# Patient Record
Sex: MALE | Race: BLACK OR AFRICAN AMERICAN
[De-identification: names, ages, dates, MRNs, and addresses within clinical notes are randomized per-mention and may not be internally consistent; named-entity substitution may affect disease eponyms.]

---

## 2018-10-01 ENCOUNTER — HOSPITAL ENCOUNTER (EMERGENCY)
Dept: HOSPITAL 25 - UCEAST | Age: 28
Discharge: HOME | End: 2018-10-01
Payer: SELF-PAY

## 2018-10-01 VITALS — SYSTOLIC BLOOD PRESSURE: 125 MMHG | DIASTOLIC BLOOD PRESSURE: 79 MMHG

## 2018-10-01 DIAGNOSIS — J02.8: Primary | ICD-10-CM

## 2018-10-01 DIAGNOSIS — H61.22: ICD-10-CM

## 2018-10-01 DIAGNOSIS — F17.200: ICD-10-CM

## 2018-10-01 PROCEDURE — 86665 EPSTEIN-BARR CAPSID VCA: CPT

## 2018-10-01 PROCEDURE — 36415 COLL VENOUS BLD VENIPUNCTURE: CPT

## 2018-10-01 PROCEDURE — 87651 STREP A DNA AMP PROBE: CPT

## 2018-10-01 PROCEDURE — 99203 OFFICE O/P NEW LOW 30 MIN: CPT

## 2018-10-01 PROCEDURE — G0463 HOSPITAL OUTPT CLINIC VISIT: HCPCS

## 2018-10-01 PROCEDURE — 86664 EPSTEIN-BARR NUCLEAR ANTIGEN: CPT

## 2018-10-01 PROCEDURE — 86308 HETEROPHILE ANTIBODY SCREEN: CPT

## 2018-10-01 NOTE — UC
Throat Pain/Nasal Vadim HPI





- HPI Summary


HPI Summary: 


 27 y/o male with nbo prior PMH, no recent ABX use with complaints of sore 

throat x 3 days, left ear pain x 24 hours.  + tob use, no ill contact.  + GI 

symptoms- nausea, no vomiting.  no fever, + chills.  no neck pain, SOB.   tried 

tylenol, no relief.  denies nasal drainage, soreness.  no abdominal pains.  + 

pain with swallowing, however able to do so.   








- History of Current Complaint


Chief Complaint: UCGeneralIllness


Stated Complaint: SORE THROAT, AND EAR ACHE


Time Seen by Provider: 10/01/18 08:12


Hx Obtained From: Patient


Onset/Duration: Gradual Onset, Lasting Days


Severity: Moderate


Pain Intensity: 7


Pain Scale Used: 0-10 Numeric


Cough: None


Associated Signs & Symptoms: Positive: Hoarseness


Related History: Smoking





- Allergies/Home Medications


Allergies/Adverse Reactions: 


 Allergies











Allergy/AdvReac Type Severity Reaction Status Date / Time


 


No Known Allergies Allergy   Verified 10/01/18 07:48











Home Medications: 


 Home Medications





Acetaminophen [Tylenol Extra Strength] 2 tab PO BID PRN 10/01/18 [History 

Confirmed 10/01/18]











PMH/Surg Hx/FS Hx/Imm Hx


Previously Healthy: Yes





- Surgical History


Surgical History: None





- Social History


Alcohol Use: Weekly


Substance Use Type: None


Smoking Status (MU): Heavy Every Day Tobacco Smoker





- Immunization History


Most Recent Tetanus Shot: Unk





Review of Systems


Constitutional: Chills, Fatigue


ENT: Sore Throat, Ear Ache


Cardiovascular: Negative


Gastrointestinal: Nausea


Genitourinary: Negative


Is Patient Immunocompromised?: No


All Other Systems Reviewed And Are Negative: Yes





Physical Exam


Triage Information Reviewed: Yes


Appearance: No Pain Distress, Well-Nourished, Ill-Appearing - minimally


Vital Signs: 


 Initial Vital Signs











Temp  97.4 F   10/01/18 07:45


 


Pulse  87   10/01/18 07:45


 


Resp  20   10/01/18 07:45


 


BP  125/79   10/01/18 07:45


 


Pulse Ox  98   10/01/18 07:45











Eyes: Positive: Conjunctiva Clear


ENT: Positive: Pharyngeal erythema - mild erythema with mild tonsillar 

enlargement, difficult to examine well due to patients inability to relax 

tongue.   strep Neg.  + submand LAD, tender to palpation, no neck stiffness/ 

pain.   no posterior LAD.   L ear with cerumen impaction, R TM normal., TMs 

normal - right, Uvula midline.  Negative: Sinus tenderness


Dental Exam: Normal


Neck: Positive: Enlarged Nodes @ - submand b/l  no posterior LAD.  Negative: 

Nuchal Rigidity


Respiratory Exam: Normal


Abdomen Description: Positive: Nontender, No Organomegaly, Soft.  Negative: 

Splenomegaly


Neurological: Positive: Alert


Psychological: Positive: Age Appropriate Behavior


Skin: Positive: Other - vitiligo





Throat Pain/Nasal Course/Dx





- Course


Course Of Treatment: strep negative, monospot obtained.   Conservative treatment

, OTCs, motrin, throat lozenges, work note.





- Differential Dx/Diagnosis


Differential Diagnosis/HQI/PQRI: Pharyngitis


Provider Diagnoses: viral pharyngitis, left cerumen impaction





Discharge





- Sign-Out/Discharge


Documenting (check all that apply): Patient Departure


All imaging exams completed and their final reports reviewed: Yes





- Discharge Plan


Condition: Good


Disposition: HOME


Prescriptions: 


Dextromethorphan/Benzocaine [Cepacol Sorethroat-Cough Arian] 1 each PO Q4HR #30 

lozenge


Patient Education Materials:  Pharyngitis (ED)


Forms:  *Work Release


Referrals: 


No Primary Care Phys,NOPCP [Primary Care Provider] - 


Additional Instructions: 


- Increase fluid intake 


- Motrin/ iburpfen as needed for throat pain 


- humidifier at night to help with symptoms 


- Go to ER with decreased swallowing, fever > 102, abdominal pain 


- Avoid contact sports until results of monospot return in 24 hours 


  





- Billing Disposition and Condition


Condition: GOOD


Disposition: Home

## 2018-11-26 ENCOUNTER — HOSPITAL ENCOUNTER (EMERGENCY)
Dept: HOSPITAL 25 - ED | Age: 28
Discharge: HOME | End: 2018-11-26
Payer: SELF-PAY

## 2018-11-26 VITALS — SYSTOLIC BLOOD PRESSURE: 113 MMHG | DIASTOLIC BLOOD PRESSURE: 71 MMHG

## 2018-11-26 DIAGNOSIS — R42: ICD-10-CM

## 2018-11-26 DIAGNOSIS — R07.89: Primary | ICD-10-CM

## 2018-11-26 DIAGNOSIS — F17.200: ICD-10-CM

## 2018-11-26 LAB
BASOPHILS # BLD AUTO: 0 10^3/UL (ref 0–0.2)
EOSINOPHIL # BLD AUTO: 0 10^3/UL (ref 0–0.6)
HCT VFR BLD AUTO: 41 % (ref 42–52)
HGB BLD-MCNC: 14 G/DL (ref 14–18)
LYMPHOCYTES # BLD AUTO: 2.2 10^3/UL (ref 1–4.8)
MCH RBC QN AUTO: 31 PG (ref 27–31)
MCHC RBC AUTO-ENTMCNC: 34 G/DL (ref 31–36)
MCV RBC AUTO: 90 FL (ref 80–94)
MONOCYTES # BLD AUTO: 0.4 10^3/UL (ref 0–0.8)
NEUTROPHILS # BLD AUTO: 1.8 10^3/UL (ref 1.5–7.7)
NRBC # BLD AUTO: 0 10^3/UL
NRBC BLD QL AUTO: 0.2
PLATELET # BLD AUTO: 255 10^3/UL (ref 150–450)
RBC # BLD AUTO: 4.59 10^6/UL (ref 4–5.4)
WBC # BLD AUTO: 4.5 10^3/UL (ref 3.5–10.8)

## 2018-11-26 PROCEDURE — 99284 EMERGENCY DEPT VISIT MOD MDM: CPT

## 2018-11-26 PROCEDURE — 84484 ASSAY OF TROPONIN QUANT: CPT

## 2018-11-26 PROCEDURE — 93005 ELECTROCARDIOGRAM TRACING: CPT

## 2018-11-26 PROCEDURE — 80053 COMPREHEN METABOLIC PANEL: CPT

## 2018-11-26 PROCEDURE — 83605 ASSAY OF LACTIC ACID: CPT

## 2018-11-26 PROCEDURE — 85025 COMPLETE CBC W/AUTO DIFF WBC: CPT

## 2018-11-26 PROCEDURE — 71046 X-RAY EXAM CHEST 2 VIEWS: CPT

## 2018-11-26 PROCEDURE — 36415 COLL VENOUS BLD VENIPUNCTURE: CPT

## 2018-11-26 PROCEDURE — 85379 FIBRIN DEGRADATION QUANT: CPT

## 2018-11-26 NOTE — ED
HPI Chest Pain





- HPI Summary


HPI Summary: 


The pt is a 29 y/o male brought in by ambulance to [facility] c/o  since 10:50 hrs 

today. It started on the R side and moved gradually to the L side. The sharp 

pain rated 4/10 in severity is aggravated by raising his L arm, car motion, 

laughing and palpation. He notes lightheadedness but denies nausea, SOB, cold 

sweats and injury. He took Aleve to no relief. The pt  reports no pertinent 

PMHx. His mother aged 63 is still alive. 





 Home Medications











 Medication  Instructions  Recorded  Confirmed  Type


 


Acetaminophen [Acetaminophen Extra 1,000 mg PO BID PRN 11/26/18 11/26/18 History





Strength]    


 


Dextromethorphan/Benzocaine 1 each PO Q4HR PRN 11/26/18 11/26/18 History





[Cepacol Sorethroat-Cough Arian]    














- History of Current Complaint


Chief Complaint: EDChestWallPain


Time Seen by Provider: 11/26/18 17:18


Hx Obtained From: Patient


Onset/Duration: Started Hours Ago - 10:50 hrs today, Still Present


Timing: Intermittent


Initial Severity: Mild


Current Severity: Mild


Pain Intensity: 4


Pain Scale Used: 0-10 Numeric


Chest Pain Location: Diffuse - R side


Chest Pain Radiates: Yes


Chest Pain Radiates To:: Other - To the L side


Character: Sharp/Stabbing


Aggravating Factor(s): Other: - raising his L arm, bumpy car rides,  laughing 

and palpation.


Alleviating Factor(s): Nothing


Associated Signs and Symptoms: Positive: Chest Pain, Lightheadedness.  Negative

: Shortness of Breath, Nausea





- Allergy/Home Medications


Allergies/Adverse Reactions: 


 Allergies











Allergy/AdvReac Type Severity Reaction Status Date / Time


 


No Known Allergies Allergy   Verified 10/01/18 07:48











Home Medications: 


 Home Medications





Acetaminophen [Acetaminophen Extra Strength] 1,000 mg PO BID PRN 11/26/18 [

History Confirmed 11/26/18]


Dextromethorphan/Benzocaine [Cepacol Sorethroat-Cough Arian] 1 each PO Q4HR PRN 11 /26/18 [History Confirmed 11/26/18]











PMH/Surg Hx/FS Hx/Imm Hx


Previously Healthy: Yes


Endocrine/Hematology History: 


   Denies: Hx Diabetes


Cardiovascular History: 


   Denies: Hx Hypercholesterolemia, Hx Hypertension


Respiratory History: Reports: Other Respiratory Problems/Disorders - Pharyngitis


Sensory History: 


   Denies: Hx Deafness





- Cancer History


Cancer Type, Location and Year: None reported





- Surgical History


Surgery Procedure, Year, and Place: None reported


Infectious Disease History: No


Infectious Disease History: 


   Denies: Traveled Outside the [place] in Last 30 Days





- Family History


Known Family History: 


   Negative: Cardiac Disease, Hypertension, Diabetes





- Social History


Occupation: Employed Full-time


Lives: Dormitory/Roommates - Significant other


Alcohol Use: Weekly


Substance Use Type: Reports: None


Hx Tobacco Use: Yes


Smoking Status (MU): Heavy Every Day Tobacco Smoker





Review of Systems


Constitutional: Other - Positive: Lightheadedness


Negative: Skin Diaphoresis


Positive: Chest Pain


Negative: Shortness Of Breath


Negative: Nausea


Musculoskeletal: Negative - Injury 


All Other Systems Reviewed And Are Negative: Yes





Physical Exam





- Summary


Physical Exam Summary: 


Appearance: The patient is well-nourished in no acute distress and in no acute 

pain.


 


Skin: The skin is warm and dry and skin color reflects adequate perfusion.


 


HEENT: The head is normocephalic and atraumatic. The pupils are equal and 

reactive. The conjunctivae are clear and without drainage. Nares are patent and 

without drainage. Mouth reveals moist mucous membranes and the throat is 

without erythema and exudate. The external ears are intact. The ear canals are 

patent and without drainage. The tympanic membranes are intact.


 


Neck: The neck is supple with full range of motion and non-tender. There are no 

carotid bruits. There is no neck vein distension.


 


Respiratory: Chest is tender to palpation in the sternum. Lungs are clear to 

auscultation and breath sounds are symmetrical and equal.


 


Cardiovascular: Heart is regular rate and rhythm. There is no murmur or rub 

auscultated. There is no peripheral edema and pulses are symmetrical and equal.


 


Abdomen: The abdomen is soft and non-tender. There are normal bowel sounds 

heard in all four quadrants and there is no organomegaly palpated.


 


Musculoskeletal: There is no back tenderness noted. Extremities are non-tender 

with full range of motion. There is good capillary refill. There is no 

peripheral edema or calf tenderness elicited.


 


Neurological: Patient is alert and oriented to person, place and time. The 

patient has symmetrical motor strength in all four extremities. Cranial nerves 

are grossly intact. Deep tendon reflexes are symmetrical and equal in all four 

extremities.


 


Psychiatric: The patient has an appropriate affect and does not exhibit any 

anxiety or depression.


Triage Information Reviewed: Yes


Vital Signs On Initial Exam: 


 Initial Vitals











Temp Pulse Resp BP Pulse Ox


 


 98.2 F   90   16   123/85   98 


 


 11/26/18 17:09  11/26/18 17:09  11/26/18 17:09  11/26/18 17:09  11/26/18 17:09











Vital Signs Reviewed: Yes





Diagnostics





- Vital Signs


 Vital Signs











  Temp Pulse Resp BP Pulse Ox


 


 11/26/18 17:19   86   


 


 11/26/18 17:10   81    98


 


 11/26/18 17:09  98.2 F  86  16  123/85  98














- Laboratory


Result Diagrams: 


 11/26/18 17:59





 11/26/18 17:59


Lab Statement: Any lab studies that have been ordered have been reviewed, and 

results considered in the medical decision making process.





- Radiology


  ** CXR


Radiology Interpretation Completed By: Radiologist - IMPRESSION:  #. No 

evidence for acute intrathoracic disease. The ED physician reviewed this 

radiology report.





- EKG


  ** 17:08


Cardiac Rate: NL - 80 bpm


EKG Rhythm: Sinus Rhythm


ST Segment: Normal


Ectopy: None


EKG Comparison: No Significant Change





Chest Pain Course/Dx





- Course


Course Of Treatment: Mr. Rowland presented with an atypical chest pain that 

originated in the back.  He was nontoxic in appearance with stable vital signs 

on presentation.  He was kept on the monitor while EKG, chest x-ray and labs 

including d-dimer and delayed troponin were obtained.  These were all negative 

and I reassured him that nothing dangerous was happening and recommended 

nonsteroidal anti-inflammatories.





- Diagnoses


Provider Diagnoses: 


 Chest pain








Discharge





- Sign-Out/Discharge


Documenting (check all that apply): Patient Departure - DC





- Discharge Plan


Condition: Stable


Disposition: HOME


Patient Education Materials:  Chest Pain (ED)


Referrals: 


[facility] [Outside]


Additional Instructions: 


Take Naproxen(Aleve) as needed. 


Follow up with your PCP in 2 days


Return to ED for any new or worsening symptoms








- Billing Disposition and Condition


Condition: STABLE


Disposition: Home





- Attestation Statements


Document Initiated by Scribe: Yes


Documenting Scribe: Natalie Saini 


Provider For Whom Scribe is Documenting (Include Credential): Dr. Anoop Licea MD 


Scribe Attestation: 


Natalie MACKEY , scribed for Dr. Anoop Licea MD  on 11/26/18 at 2132. 


Scribe Documentation Reviewed: Yes


Provider Attestation: 


The documentation as recorded by the scribe, Natalie Saini  accurately 

reflects the service I personally performed and the decisions made by me, Dr. Anoop Licea MD

## 2019-10-16 ENCOUNTER — HOSPITAL ENCOUNTER (EMERGENCY)
Dept: HOSPITAL 25 - UCEAST | Age: 29
Discharge: HOME | End: 2019-10-16
Payer: SELF-PAY

## 2019-10-16 VITALS — SYSTOLIC BLOOD PRESSURE: 118 MMHG | DIASTOLIC BLOOD PRESSURE: 77 MMHG

## 2019-10-16 DIAGNOSIS — J02.9: Primary | ICD-10-CM

## 2019-10-16 DIAGNOSIS — F17.210: ICD-10-CM

## 2019-10-16 DIAGNOSIS — H66.92: ICD-10-CM

## 2019-10-16 PROCEDURE — G0463 HOSPITAL OUTPT CLINIC VISIT: HCPCS

## 2019-10-16 PROCEDURE — 99212 OFFICE O/P EST SF 10 MIN: CPT

## 2019-10-16 PROCEDURE — 87651 STREP A DNA AMP PROBE: CPT

## 2019-10-16 NOTE — UC
Ear Complaint HPI





- HPI Summary


HPI Summary: 





Patient has had cold symptoms over the past week with sore throat over the past 

couple days and left earache.  He denies any fever or chills.





- History of Current Complaint


Chief Complaint: UCGeneralIllness


Stated Complaint: SORE THROAT


Time Seen by Provider: 10/16/19 13:32


Onset/Duration: Gradual Onset


Severity Initially: Mild


Severity Currently: Mild


Pain Intensity: 6


Aggravating Factors: Nothing


Associated Signs/Symptoms: Positive: URI Symptoms





- Allergies/Home Medications


Allergies/Adverse Reactions: 


 Allergies











Allergy/AdvReac Type Severity Reaction Status Date / Time


 


No Known Allergies Allergy   Verified 10/16/19 12:10














PMH/Surg Hx/FS Hx/Imm Hx


Previously Healthy: Yes





- Surgical History


Surgical History: None


Surgery Procedure, Year, and Place: None reported





- Family History


Known Family History: 


   Negative: Cardiac Disease, Hypertension, Diabetes





- Social History


Alcohol Use: Occasionally


Substance Use Type: None


Smoking Status (MU): Heavy Every Day Tobacco Smoker


Amount Used/How Often: 1/2 PPD


Household Exposure Type: Cigarettes





- Immunization History


Most Recent Tetanus Shot: Unk





Review of Systems


All Other Systems Reviewed And Are Negative: Yes


ENT: Positive: Sore Throat, Ear Ache, Nasal Discharge


Is Patient Immunocompromised?: No





Physical Exam


Triage Information Reviewed: Yes


Appearance: Well-Appearing, No Pain Distress, Well-Nourished


Vital Signs: 


 Initial Vital Signs











Temp  98.8 F   10/16/19 12:10


 


Pulse  92   10/16/19 12:10


 


Resp  18   10/16/19 12:10


 


BP  118/77   10/16/19 12:10


 


Pulse Ox  100   10/16/19 12:10











Vital Signs Reviewed: Yes


Eyes: Positive: Conjunctiva Clear


ENT: Positive: Pharynx normal, Nasal congestion, Nasal drainage - Clear nasal 

coryza., TM red - Left tympanic membrane is injected and mildly bulging with 

yellow purulence behind the tympanic membrane., Uvula midline.  Negative: 

Tonsillar swelling, Tonsillar exudate, Trismus, Muffled voice, Hoarse voice


Neck: Positive: Supple, Nontender, Enlarged Nodes @ - Bilateral tonsillar lymph 

node enlargement.


Respiratory: Positive: Lungs clear, Normal breath sounds, No respiratory 

distress, No accessory muscle use


Abdomen Description: Positive: Nontender, No Organomegaly, Soft.  Negative: CVA 

Tenderness (R), CVA Tenderness (L), Hepatomegaly, Splenomegaly


Bowel Sounds: Positive: Present


Musculoskeletal Exam: Normal


Neurological Exam: Normal


Psychological Exam: Normal


Skin Exam: Normal





Ear Complaint Course/Dx





- Course


Course Of Treatment: 





Patient is comfortable here.  Rapid strep test was negative.  I am going to 

treated for a left otitis media with amoxicillin 875 mg by mouth twice a day 

10 days.  He is to follow-up with [facility] if any concerns or if 

no improvement in 3 or 4 days.





- Differential Dx/Diagnosis


Provider Diagnosis: 


 Pharyngitis, Left otitis media








Discharge ED





- Sign-Out/Discharge


Documenting (check all that apply): Patient Departure


All imaging exams completed and their final reports reviewed: No Studies





- Discharge Plan


Condition: Good


Disposition: HOME


Prescriptions: 


Amoxicillin PO (*) [Amoxicillin 875 MG (*)] 875 mg PO BID 10 Days #20 tab


Patient Education Materials:  Ear Infection (ED)


Referrals: 


No Primary Care Phys,NOPCP [Primary Care Provider] - 


[facility] [Outside]


Additional Instructions: 


Increase fluids, may take Tylenol every 4 hours and ibuprofen every 8 hours for 

pain or fever.  Follow-up at [facility] if no improvement in 3 or 

4 days.





- Billing Disposition and Condition


Condition: GOOD


Disposition: Home

## 2020-03-14 ENCOUNTER — HOSPITAL ENCOUNTER (EMERGENCY)
Dept: HOSPITAL 25 - UCEAST | Age: 30
Discharge: HOME | End: 2020-03-14
Payer: COMMERCIAL

## 2020-03-14 VITALS — DIASTOLIC BLOOD PRESSURE: 81 MMHG | SYSTOLIC BLOOD PRESSURE: 118 MMHG

## 2020-03-14 DIAGNOSIS — N34.2: Primary | ICD-10-CM

## 2020-03-14 DIAGNOSIS — F17.210: ICD-10-CM

## 2020-03-14 PROCEDURE — 96372 THER/PROPH/DIAG INJ SC/IM: CPT

## 2020-03-14 PROCEDURE — 99212 OFFICE O/P EST SF 10 MIN: CPT

## 2020-03-14 PROCEDURE — G0463 HOSPITAL OUTPT CLINIC VISIT: HCPCS

## 2020-03-14 PROCEDURE — 81003 URINALYSIS AUTO W/O SCOPE: CPT

## 2020-03-14 PROCEDURE — 87086 URINE CULTURE/COLONY COUNT: CPT

## 2020-03-14 PROCEDURE — 87491 CHLMYD TRACH DNA AMP PROBE: CPT

## 2020-03-14 PROCEDURE — 87591 N.GONORRHOEAE DNA AMP PROB: CPT

## 2020-03-14 NOTE — UC
Complaint Male HPI





- HPI Summary


HPI Summary: 





30 yo male presents with urinary complaint. He tells me that 2 days ago he had 

unprotected sex with a new female partner. Last night noticed burning with 

urination that has persisted into today. He is urinating without difficulty. 

Denies fever, chills, abdominal pain, n/v, blood in urine, flank pain. He 

states he has had an STD before and this doesn't feel quite the same. Denies 

testicular pain or rashes. 





- History of Current Complaint


Chief Complaint: UCGU


Stated Complaint: URINARY ISSUE


Time Seen by Provider: 03/14/20 12:40


Hx Obtained From: Patient


Onset/Duration: Sudden Onset


Severity Initially: Mild


Severity Currently: Mild


Pain Intensity: 4


Pain Scale Used: 0-10 Numeric





- Allergies/Home Medications


Allergies/Adverse Reactions: 


 Allergies











Allergy/AdvReac Type Severity Reaction Status Date / Time


 


No Known Allergies Allergy   Verified 03/14/20 12:02











Home Medications: 


 Home Medications





NK [No Home Medications Reported]  03/14/20 [History Confirmed 03/14/20]











PMH/Surg Hx/FS Hx/Imm Hx





- Additional Past Medical History


Additional PMH: 





None





- Surgical History


Surgical History: None


Surgery Procedure, Year, and Place: None reported





- Family History


Known Family History: 


   Negative: Cardiac Disease, Hypertension, Diabetes





- Social History


Lives: With Family


Alcohol Use: Occasionally


Substance Use Type: None


Smoking Status (MU): Heavy Every Day Tobacco Smoker


Amount Used/How Often: 1/2 PPD


Household Exposure Type: Cigarettes





- Immunization History


Most Recent Tetanus Shot: Unk





Review of Systems


All Other Systems Reviewed And Are Negative: No


Constitutional: Positive: Negative


Skin: Positive: Negative


Respiratory: Positive: Negative


Cardiovascular: Positive: Negative


Gastrointestinal: Positive: Negative


Genitourinary: Positive: Dysuria


Neurological/Mental Status: Positive: Negative


Psychological: Positive: Negative





Physical Exam





- Summary


Physical Exam Summary: 





GENERAL: NAD. WDWN. No pain distress.


SKIN: No rashes, sores, lesions, or open wounds.


NECK: Supple. Nontender. No lymphadenopathy. 


CHEST:  CTAB. No r/r/w. No accessory muscle use. Breathing comfortably and in 

no distress.


CV:  RRR. Pulses intact. Cap refill <2seconds


ABDOMEN:  Soft. NTTP. No distention or guarding. No CVA tenderness. Bowel 

sounds present


NEURO: Alert. 


PSYCH: Age appropriate behavior.


Triage Information Reviewed: Yes


Vital Signs: 


 Initial Vital Signs











Temp  98.8 F   03/14/20 12:00


 


Pulse  89   03/14/20 12:00


 


Resp  18   03/14/20 12:00


 


BP  118/81   03/14/20 12:00


 


Pulse Ox  100   03/14/20 12:00








 Laboratory Tests











  03/14/20





  12:42


 


POC Urine Color  Sophie


 


POC Urine Clarity  Clear


 


POC Urine pH  6.5


 


POC Ur Specif Gravity  1.020


 


POC Urine Protein  Trace A


 


POC Ur Glucose (UA)  Negative


 


POC Urine Ketones  Negative


 


POC Urine Blood  2+ A


 


POC Urine Nitrite  Negative


 


POC Urine Bilirubin  Negative


 


POC Urine Urobilinogen  1.0


 


POC U Leukocyte Esteras  Negative











Vital Signs Reviewed: Yes


Male Genital Exam: Positive: Normal Genitalia.  Negative: Epididymal Tenderness

, Inguinal Tenderness, Lesions, Scrotum Tenderness (R), Scrotum Tenderness (L), 

Testicular Tenderness (R), Testicular Tenderness (L), Urethral Discharge





 Complaint Male Course/Dx





- Course


Course Of Treatment: 





UA as above. 


Suspect urethritis. He is not having any flank pain and has no hx of kidney 

stones - thus low suspicion today.


Urine will be sent for urine culture and GC/C. 


Treated today with 1gm of azithromycin and 250mg ceftriaxone. Will adjust 

future treatment based on culture results and symptomatic improvement. 





- Differential Dx/Diagnosis


Provider Diagnosis: 


 Urethritis








Discharge ED





- Sign-Out/Discharge


Documenting (check all that apply): Patient Departure


All imaging exams completed and their final reports reviewed: No Studies





- Discharge Plan


Condition: Stable


Disposition: HOME


Patient Education Materials:  Nonspecific Urethritis in Men (ED)


Referrals: 


No Primary Care Phys,NOPCP [Primary Care Provider] - 


Additional Instructions: 


If you develop a fever, shortness of breath, chest pain, new or worsening 

symptoms - please call your PCP or go to the ED immediately.


 


Please refrain from sexual activity for 7 days from the date your symptoms 

resolve.





Urine was sent for further testing today





- Billing Disposition and Condition


Condition: STABLE


Disposition: Home

## 2020-03-15 NOTE — UC
- Progress Note


Progress Note: 





3/15/2020


Urine culture negative. Pt DX w/ urethritis and still awaiting for GC/Chlamydia 

results.


No change


Tamiko Johnson PA-C





Course/Dx





- Diagnoses


Provider Diagnoses: 


 Urethritis








Discharge ED





- Sign-Out/Discharge


Documenting (check all that apply): Patient Departure - D/C home


All imaging exams completed and their final reports reviewed: No Studies





- Discharge Plan


Condition: Stable


Disposition: HOME


Patient Education Materials:  Nonspecific Urethritis in Men (ED)


Referrals: 


No Primary Care Phys,NOPCP [Primary Care Provider] - 


Additional Instructions: 


If you develop a fever, shortness of breath, chest pain, new or worsening 

symptoms - please call your PCP or go to the ED immediately.


 


Please refrain from sexual activity for 7 days from the date your symptoms 

resolve.





Urine was sent for further testing today





- Billing Disposition and Condition


Condition: STABLE


Disposition: Home